# Patient Record
Sex: MALE | Race: WHITE | NOT HISPANIC OR LATINO | Employment: FULL TIME | URBAN - METROPOLITAN AREA
[De-identification: names, ages, dates, MRNs, and addresses within clinical notes are randomized per-mention and may not be internally consistent; named-entity substitution may affect disease eponyms.]

---

## 2024-09-20 ENCOUNTER — APPOINTMENT (OUTPATIENT)
Dept: RADIOLOGY | Facility: CLINIC | Age: 44
End: 2024-09-20
Payer: COMMERCIAL

## 2024-09-20 VITALS
BODY MASS INDEX: 34.4 KG/M2 | HEART RATE: 82 BPM | HEIGHT: 72 IN | DIASTOLIC BLOOD PRESSURE: 83 MMHG | SYSTOLIC BLOOD PRESSURE: 118 MMHG | WEIGHT: 254 LBS

## 2024-09-20 DIAGNOSIS — M25.511 RIGHT SHOULDER PAIN, UNSPECIFIED CHRONICITY: ICD-10-CM

## 2024-09-20 DIAGNOSIS — M24.811 INTERNAL DERANGEMENT OF RIGHT SHOULDER: Primary | ICD-10-CM

## 2024-09-20 PROCEDURE — 73030 X-RAY EXAM OF SHOULDER: CPT

## 2024-09-20 PROCEDURE — 99204 OFFICE O/P NEW MOD 45 MIN: CPT | Performed by: ORTHOPAEDIC SURGERY

## 2024-09-20 RX ORDER — IBUPROFEN 200 MG
TABLET ORAL EVERY 6 HOURS PRN
COMMUNITY

## 2024-09-20 RX ORDER — MELOXICAM 15 MG/1
15 TABLET ORAL DAILY
Qty: 30 TABLET | Refills: 0 | Status: SHIPPED | OUTPATIENT
Start: 2024-09-20
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
## 2024-09-20 NOTE — LETTER
September 20, 2024     Patient: Abimael Colón  YOB: 1980  Date of Visit: 9/20/2024      To Whom it May Concern:    Abimael Colón is under my professional care. Abimael was seen in my office on 9/20/2024. Abimael can return to work on light duty, including no pushing, pulling, or lifting greater than 5 pounds.     If you have any questions or concerns, please don't hesitate to call.         Sincerely,          Favian Toscano MD        CC: No Recipients

## 2024-10-04 ENCOUNTER — HOSPITAL ENCOUNTER (OUTPATIENT)
Dept: MRI IMAGING | Facility: HOSPITAL | Age: 44
End: 2024-10-04
Payer: OTHER MISCELLANEOUS

## 2024-10-04 DIAGNOSIS — M24.811 INTERNAL DERANGEMENT OF RIGHT SHOULDER: ICD-10-CM

## 2024-10-04 PROCEDURE — 73221 MRI JOINT UPR EXTREM W/O DYE: CPT

## 2024-10-08 ENCOUNTER — TELEPHONE (OUTPATIENT)
Age: 44
End: 2024-10-08

## 2024-10-11 VITALS
DIASTOLIC BLOOD PRESSURE: 90 MMHG | BODY MASS INDEX: 34.4 KG/M2 | SYSTOLIC BLOOD PRESSURE: 143 MMHG | HEIGHT: 72 IN | WEIGHT: 254 LBS | HEART RATE: 83 BPM

## 2024-10-11 DIAGNOSIS — S43.51XD SPRAIN OF RIGHT ACROMIOCLAVICULAR LIGAMENT, SUBSEQUENT ENCOUNTER: Primary | ICD-10-CM

## 2024-10-11 PROCEDURE — 99213 OFFICE O/P EST LOW 20 MIN: CPT | Performed by: ORTHOPAEDIC SURGERY

## 2024-10-11 RX ORDER — TRAMADOL HYDROCHLORIDE 50 MG/1
50 TABLET ORAL EVERY 8 HOURS PRN
COMMUNITY
Start: 2024-09-11

## 2024-10-11 NOTE — LETTER
October 11, 2024     Patient: Abimael Colón  YOB: 1980  Date of Visit: 10/11/2024      To Whom it May Concern:    Abimael Colón is under my professional care. Abimale was seen in my office on 10/11/2024. Abimael  continues to be treated for a shoulder injury. He will remain on light duty at this time with the following limitations: No lifting, pushing, or pulling greater than 20 pounds starting today, 10/11/24. He can return to full duty starting on 10/22/24.     If you have any questions or concerns, please don't hesitate to call.         Sincerely,          Favian Toscano MD
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
## 2024-10-31 ENCOUNTER — EVALUATION (OUTPATIENT)
Dept: PHYSICAL THERAPY | Facility: CLINIC | Age: 44
End: 2024-10-31
Payer: OTHER MISCELLANEOUS

## 2024-10-31 DIAGNOSIS — M54.50 ACUTE LEFT-SIDED LOW BACK PAIN, UNSPECIFIED WHETHER SCIATICA PRESENT: ICD-10-CM

## 2024-10-31 DIAGNOSIS — M25.511 ACUTE PAIN OF RIGHT SHOULDER: Primary | ICD-10-CM

## 2024-10-31 DIAGNOSIS — R29.898 RIGHT ARM WEAKNESS: ICD-10-CM

## 2024-10-31 DIAGNOSIS — S43.51XD SPRAIN OF RIGHT ACROMIOCLAVICULAR LIGAMENT, SUBSEQUENT ENCOUNTER: ICD-10-CM

## 2024-10-31 DIAGNOSIS — M25.531 RIGHT WRIST PAIN: ICD-10-CM

## 2024-10-31 DIAGNOSIS — S46.011D STRAIN OF RIGHT ROTATOR CUFF CAPSULE, SUBSEQUENT ENCOUNTER: ICD-10-CM

## 2024-10-31 DIAGNOSIS — M54.2 NECK PAIN: ICD-10-CM

## 2024-10-31 PROCEDURE — 97162 PT EVAL MOD COMPLEX 30 MIN: CPT
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
## 2024-11-05 ENCOUNTER — OFFICE VISIT (OUTPATIENT)
Dept: PHYSICAL THERAPY | Facility: CLINIC | Age: 44
End: 2024-11-05
Payer: OTHER MISCELLANEOUS

## 2024-11-05 DIAGNOSIS — M25.531 RIGHT WRIST PAIN: ICD-10-CM

## 2024-11-05 DIAGNOSIS — S43.51XD SPRAIN OF RIGHT ACROMIOCLAVICULAR LIGAMENT, SUBSEQUENT ENCOUNTER: Primary | ICD-10-CM

## 2024-11-05 DIAGNOSIS — R29.898 RIGHT ARM WEAKNESS: ICD-10-CM

## 2024-11-05 DIAGNOSIS — S46.011D STRAIN OF RIGHT ROTATOR CUFF CAPSULE, SUBSEQUENT ENCOUNTER: ICD-10-CM

## 2024-11-05 DIAGNOSIS — M54.2 NECK PAIN: ICD-10-CM

## 2024-11-05 DIAGNOSIS — M54.50 ACUTE LEFT-SIDED LOW BACK PAIN, UNSPECIFIED WHETHER SCIATICA PRESENT: ICD-10-CM

## 2024-11-05 DIAGNOSIS — M25.511 ACUTE PAIN OF RIGHT SHOULDER: ICD-10-CM

## 2024-11-05 PROCEDURE — 97110 THERAPEUTIC EXERCISES: CPT

## 2024-11-05 PROCEDURE — 97140 MANUAL THERAPY 1/> REGIONS: CPT

## 2024-11-05 NOTE — PROGRESS NOTES
Daily Note     Today's date: 2024  Patient name: Abimael Colón  : 1980  MRN: [id number]  Referring provider: Favian Toscano*  Dx:   Encounter Diagnosis     ICD-10-CM    1. Sprain of right acromioclavicular ligament, subsequent encounter  S43.51XD       2. Strain of right rotator cuff capsule, subsequent encounter  S46.011D       3. Acute pain of right shoulder  M25.511       4. Neck pain  M54.2       5. Right wrist pain  M25.531       6. Acute left-sided low back pain, unspecified whether sciatica present  M54.50       7. Right arm weakness  R29.898           Start Time: 1752  Stop Time: 1835  Total time in clinic (min): 43 minutes    Subjective: Pt states that he continues with significant R shoulder, L neck, and L low back pain. Pt has been gradually performing his HEP for his shoulder more but it is painful at times. Pt continues to have difficulty at his job due to the pain, he continues to limp, and has significant pain with transitioning positions.       Objective: See treatment diary below  C/S AROM:  -Flexion= minimal restrictions  -Extension= major restrictions w/pain  -Lateral flexion= moderate restrictions b/l w/pain  -Rotation= moderate restrictions R w/pain, minimal restrictions L  -Retraction= moderate restrictions w/pain  -Protrusion= normal w/pain    Assessment: Tolerated treatment well. Patient would benefit from continued PT. Assessed pt's shoulder HEP and was given minor VC for arm positioning to help reduce irritation. Pt's c/s was assessed today showing significant restrictions into most directions, however no changes seen in shoulder with c/s motions. Pt also demonstrated hypomobility with pain of all c/s vertebrae and guarding/spasms in his c/s musculature.       Plan: Continue per plan of care.  Progress treatment as tolerated.        Insurance:  AMA/CMS Eval/ Re-eval Auth #/ Referral # Total units or visits Start date  Expiration date KX? Visit limitation?  PT  only or  PT+OT? Co-Insurance   CMS 10/31/24   10/31/24 12/20/24                     POC Start Date POC Expiration Date Signed POC?   10/31/24 12/26/24 PENDING      Date               Visits/Units:  Used               Authed:  Remaining                      Precautions: standard  HEP: Access Code [email or web address]    Date    11/5 10/31/24   Visit Number    2 1 (IE)   Manuals        C/s PA mobilizations    Gr. I    STM    suboccipitals    C/s traction    BT performed            Neuro Re-Ed         Scap retr        Chin tucks    1x10 sitting  5x supine                                            Ther Ex        GH table slides        Cane ER PROM        Cane abd PROM        Towel  squeezes        Supine cane flexion PROM    1x10                            Ther Activity                        Gait Training                        Modalities

## 2024-11-14 ENCOUNTER — APPOINTMENT (OUTPATIENT)
Dept: PHYSICAL THERAPY | Facility: CLINIC | Age: 44
End: 2024-11-14
Payer: OTHER MISCELLANEOUS

## 2024-11-26 ENCOUNTER — OFFICE VISIT (OUTPATIENT)
Dept: PHYSICAL THERAPY | Facility: CLINIC | Age: 44
End: 2024-11-26
Payer: OTHER MISCELLANEOUS

## 2024-11-26 DIAGNOSIS — S43.51XD SPRAIN OF RIGHT ACROMIOCLAVICULAR LIGAMENT, SUBSEQUENT ENCOUNTER: Primary | ICD-10-CM

## 2024-11-26 DIAGNOSIS — M54.2 NECK PAIN: ICD-10-CM

## 2024-11-26 DIAGNOSIS — M25.531 RIGHT WRIST PAIN: ICD-10-CM

## 2024-11-26 DIAGNOSIS — R29.898 RIGHT ARM WEAKNESS: ICD-10-CM

## 2024-11-26 DIAGNOSIS — M25.511 ACUTE PAIN OF RIGHT SHOULDER: ICD-10-CM

## 2024-11-26 DIAGNOSIS — S46.011D STRAIN OF RIGHT ROTATOR CUFF CAPSULE, SUBSEQUENT ENCOUNTER: ICD-10-CM

## 2024-11-26 DIAGNOSIS — M54.50 ACUTE LEFT-SIDED LOW BACK PAIN, UNSPECIFIED WHETHER SCIATICA PRESENT: ICD-10-CM

## 2024-11-26 PROCEDURE — 97110 THERAPEUTIC EXERCISES: CPT

## 2024-11-26 NOTE — PROGRESS NOTES
Daily Note     Today's date: 2024  Patient name: Abimael Colón  : 1980  MRN: [id number]  Referring provider: Favian Toscano*  Dx:   Encounter Diagnosis     ICD-10-CM    1. Sprain of right acromioclavicular ligament, subsequent encounter  S43.51XD       2. Strain of right rotator cuff capsule, subsequent encounter  S46.011D       3. Acute pain of right shoulder  M25.511       4. Neck pain  M54.2       5. Right wrist pain  M25.531       6. Acute left-sided low back pain, unspecified whether sciatica present  M54.50       7. Right arm weakness  R29.898           Start Time: 175  Stop Time: 183  Total time in clinic (min): 46 minutes    Subjective: Pt reports that he has been doing better overall recently, he isn't walking with as much of a limp but still is experiencing L low back/hip pain and numbness into the posterior aspect of his leg. He has been able to move and use his R shoulder more with less pain but notes constant crepitus and also is a little bit more flexible in his neck without headaches. Besides the low back and LE symptoms pt continues with popping during any gripping with his R hand and continues to note weakness and difficulty with it. Pt has also begun noticing R knee pain the past few weeks that wasn't there after the surgery.       Objective: See treatment diary below  Lumbar AROM:  -Flexion = mild restriction w/pain  -Extension= moderate restriction w/pain  -Lateral flexion= L moderate restriction w/pain, R mild restriction  -Rotation= L mild restriction w/pain, R WNL    Mechanical Assessment L/S:  - RFIS- produced, NW  -ANA- produced, decreased    L Hip Special Testing:  RASHAD= positive  FADIR= positive    R Knee Special Testing:  Valgus @0= positive for pain    TTP along medial and lateral R knee joint lines  Hypomobility of R patella globally    Assessment: Tolerated treatment well. Patient demonstrated fatigue post treatment, exhibited good technique with  therapeutic exercises, and would benefit from continued PT. Focused today's session on assessing pt's low back & LE symptoms. Pt demonstrated pain with lumbar flexion, extension, L lateral flexion and L rotation. Repeated motion testing of the lumbar spine revealed decreased low back pain and tightness with repeated standing lumbar extension. No numbness provoked today. Pt demonstrated significant tightness of his L posterior and lateral hip musculature, hypomobility of his R patella, pain with R knee valgus stress testing, and TTP along the R knee joint line. Pt was educated on these findings as they pertain to his accident and injuries, with the R knee most likely being from overloading of the tendons and ligaments due to compensations from his L low back and hip pain. Pt's HEP was updated based on today's findings. More testing needed on pt's R hand/wrist symptoms.       Plan: Continue per plan of care.  Progress treatment as tolerated.          Insurance:  [place]/CMS Eval/ Re-eval Auth #/ Referral # Total units or visits Start date  Expiration date KX? Visit limitation?  PT only or  PT+OT? Co-Insurance   CMS 10/31/24   10/31/24 12/20/24                     POC Start Date POC Expiration Date Signed POC?   10/31/24 12/26/24 PENDING      Date               Visits/Units:  Used               Authed:  Remaining                      Precautions: standard  HEP: Access Code [email or web address]    Date   11/26 11/5 10/31/24   Visit Number   3 2 1 (IE)   Manuals        C/s PA mobilizations    Gr. I    STM    suboccipitals    C/s traction    BT performed            Neuro Re-Ed         Scap retr        Chin tucks    1x10 sitting  5x supine                                            Ther Ex        GH table slides        Cane ER PROM        Cane abd PROM        Towel  squeezes        Supine cane flexion PROM    1x10                            Ther Activity                        Gait Training                        Modalities

## 2024-12-05 ENCOUNTER — OFFICE VISIT (OUTPATIENT)
Dept: PHYSICAL THERAPY | Facility: CLINIC | Age: 44
End: 2024-12-05
Payer: OTHER MISCELLANEOUS

## 2024-12-05 DIAGNOSIS — S46.011D STRAIN OF RIGHT ROTATOR CUFF CAPSULE, SUBSEQUENT ENCOUNTER: ICD-10-CM

## 2024-12-05 DIAGNOSIS — M54.2 NECK PAIN: ICD-10-CM

## 2024-12-05 DIAGNOSIS — S43.51XD SPRAIN OF RIGHT ACROMIOCLAVICULAR LIGAMENT, SUBSEQUENT ENCOUNTER: Primary | ICD-10-CM

## 2024-12-05 DIAGNOSIS — R29.898 RIGHT ARM WEAKNESS: ICD-10-CM

## 2024-12-05 DIAGNOSIS — M25.511 ACUTE PAIN OF RIGHT SHOULDER: ICD-10-CM

## 2024-12-05 DIAGNOSIS — M25.531 RIGHT WRIST PAIN: ICD-10-CM

## 2024-12-05 DIAGNOSIS — M54.50 ACUTE LEFT-SIDED LOW BACK PAIN, UNSPECIFIED WHETHER SCIATICA PRESENT: ICD-10-CM

## 2024-12-05 PROCEDURE — 97110 THERAPEUTIC EXERCISES: CPT

## 2024-12-05 PROCEDURE — 97112 NEUROMUSCULAR REEDUCATION: CPT

## 2024-12-05 NOTE — PROGRESS NOTES
Daily Note     Today's date: 2024  Patient name: Abimael Colón  : 1980  MRN: [id number]  Referring provider: Favian Toscano*  Dx:   Encounter Diagnosis     ICD-10-CM    1. Sprain of right acromioclavicular ligament, subsequent encounter  S43.51XD       2. Strain of right rotator cuff capsule, subsequent encounter  S46.011D       3. Acute pain of right shoulder  M25.511       4. Neck pain  M54.2       5. Right wrist pain  M25.531       6. Acute left-sided low back pain, unspecified whether sciatica present  M54.50       7. Right arm weakness  R29.898           Start Time: 175  Stop Time: 183  Total time in clinic (min): 40 minutes    Subjective: Pt reports that he has been in training the past week so he hasn't had as much pain recently. Pt is feeling a little more flexibility recently, he continues with R shoulder pain, L knee pain, and wrist/hand pain with gripping. Pt feels there might be a little less popping in his hand with gripping but it is still present.       Objective: See treatment diary below      Assessment: Tolerated treatment well. Patient demonstrated fatigue post treatment, exhibited good technique with therapeutic exercises, and would benefit from continued PT. Pt was given UE and LE strengthening exercises as he has been improving with his overall ROM and reduction of pain.      Plan: Continue per plan of care.  Progress treatment as tolerated.          Insurance:  A/CMS Eval/ Re-eval Auth #/ Referral # Total units or visits Start date  Expiration date KX? Visit limitation?  PT only or  PT+OT? Co-Insurance   CMS 10/31/24   10/31/24 12/20/24                     POC Start Date POC Expiration Date Signed POC?   10/31/24 12/26/24 PENDING      Date               Visits/Units:  Used               Authed:  Remaining                      Precautions: standard  HEP: Access Code [email or web address]    Date  12/5 11/26 11/5 10/31/24   Visit Number  4 3 2 1 (IE)   Manuals        C/s PA  mobilizations    Gr. I    STM    suboccipitals    C/s traction    BT performed            Neuro Re-Ed         Scap retr  Rows & ext blue 15x ea      Chin tucks    1x10 sitting  5x supine    Supine horiz abd  RTB 2x10      SLR  3x stopped due to sharp pain    Quad sets 10x      TKE  [place] 3.6 2x10                      Ther Ex        GH table slides  Supine cane flex 1x10      Cane ER PROM        Cane abd PROM        Towel  squeezes        Supine cane flexion PROM    1x10                            Ther Activity                        Gait Training                        Modalities

## 2024-12-12 ENCOUNTER — OFFICE VISIT (OUTPATIENT)
Dept: PHYSICAL THERAPY | Facility: CLINIC | Age: 44
End: 2024-12-12
Payer: OTHER MISCELLANEOUS

## 2024-12-12 DIAGNOSIS — M25.531 RIGHT WRIST PAIN: ICD-10-CM

## 2024-12-12 DIAGNOSIS — R29.898 RIGHT ARM WEAKNESS: ICD-10-CM

## 2024-12-12 DIAGNOSIS — S43.51XD SPRAIN OF RIGHT ACROMIOCLAVICULAR LIGAMENT, SUBSEQUENT ENCOUNTER: Primary | ICD-10-CM

## 2024-12-12 DIAGNOSIS — S46.011D STRAIN OF RIGHT ROTATOR CUFF CAPSULE, SUBSEQUENT ENCOUNTER: ICD-10-CM

## 2024-12-12 DIAGNOSIS — M54.2 NECK PAIN: ICD-10-CM

## 2024-12-12 DIAGNOSIS — M54.50 ACUTE LEFT-SIDED LOW BACK PAIN, UNSPECIFIED WHETHER SCIATICA PRESENT: ICD-10-CM

## 2024-12-12 DIAGNOSIS — M25.511 ACUTE PAIN OF RIGHT SHOULDER: ICD-10-CM

## 2024-12-12 PROCEDURE — 97112 NEUROMUSCULAR REEDUCATION: CPT

## 2024-12-12 NOTE — PROGRESS NOTES
Daily Note     Today's date: 2024  Patient name: Abimael Colón  : 1980  MRN: [id number]  Referring provider: Favian Toscano*  Dx:   Encounter Diagnosis     ICD-10-CM    1. Sprain of right acromioclavicular ligament, subsequent encounter  S43.51XD       2. Strain of right rotator cuff capsule, subsequent encounter  S46.011D       3. Acute pain of right shoulder  M25.511       4. Neck pain  M54.2       5. Right wrist pain  M25.531       6. Acute left-sided low back pain, unspecified whether sciatica present  M54.50       7. Right arm weakness  R29.898                      Subjective: pt reports during the week he had some discomfort in his R shoulder,  he felt stiff and when he did move his shoulder he had pain with this. Earlier today he did have some popping and cracking in the shoulder when putting his jacket that was associated with a little bit of discomfort       Objective: See treatment diary below      Assessment: Tolerated treatment fair. Pt dem increased knee pain w/ flexed positions. Able to add some LB stretching w/ good tolerance. Pt remains limited due to pain in R shoulder, LB, and R knee. Cont to progress functional mobility as tolerated per symptom irritability. Patient would benefit from continued PT      Plan: Continue per plan of care.         Insurance:  A/CMS Eval/ Re-eval Auth #/ Referral # Total units or visits Start date  Expiration date KX? Visit limitation?  PT only or  PT+OT? Co-Insurance   CMS 10/31/24   10/31/24 12/20/24                     POC Start Date POC Expiration Date Signed POC?   10/31/24 12/26/24 PENDING      Date               Visits/Units:  Used               Authed:  Remaining                      Precautions: standard  HEP: Access Code [email or web address]    Date 12/12 12/5 11/26 11/5 10/31/24   Visit Number 5 4 3 2 1 (IE)   Manuals        C/s PA mobilizations    Gr. I    STM    suboccipitals    C/s traction    BT performed            Neuro Re-Ed  20'     "   Scap retr Rows & ext blue tubing 15x ea     Pain w/ rows at end range  Rows & ext blue 15x ea      Chin tucks    1x10 sitting  5x supine    Supine horiz abd Std blue tubing 1x10     Supine AROM in pain free range 1x10  RTB 2x10      B/L shoulder ER w/ scap set  YTB 1x10        LTR w/ legs supported on grn pball  5\"x5 R/L        Std pball press  5\"x15 grn pball on plinth        SLR  3x stopped due to sharp pain    Quad sets 10x      TKE  Ricarda 3.6 2x10                      Ther Ex        GH table slides Supine cane flex 1x10 Supine cane flex 1x10      Supine chest press w/ cane  2.5# CW on cane 1x10        Cane ER PROM        Cane abd PROM        Towel  squeezes        Supine cane flexion PROM    1x10                            Ther Activity                        Gait Training                        Modalities                                       "

## 2024-12-19 ENCOUNTER — EVALUATION (OUTPATIENT)
Dept: PHYSICAL THERAPY | Facility: CLINIC | Age: 44
End: 2024-12-19
Payer: OTHER MISCELLANEOUS

## 2024-12-19 DIAGNOSIS — R29.898 RIGHT ARM WEAKNESS: ICD-10-CM

## 2024-12-19 DIAGNOSIS — M25.531 RIGHT WRIST PAIN: ICD-10-CM

## 2024-12-19 DIAGNOSIS — M54.2 NECK PAIN: ICD-10-CM

## 2024-12-19 DIAGNOSIS — M25.511 ACUTE PAIN OF RIGHT SHOULDER: ICD-10-CM

## 2024-12-19 DIAGNOSIS — S46.011D STRAIN OF RIGHT ROTATOR CUFF CAPSULE, SUBSEQUENT ENCOUNTER: ICD-10-CM

## 2024-12-19 DIAGNOSIS — S43.51XD SPRAIN OF RIGHT ACROMIOCLAVICULAR LIGAMENT, SUBSEQUENT ENCOUNTER: Primary | ICD-10-CM

## 2024-12-19 DIAGNOSIS — M54.50 ACUTE LEFT-SIDED LOW BACK PAIN, UNSPECIFIED WHETHER SCIATICA PRESENT: ICD-10-CM

## 2024-12-19 PROCEDURE — 97110 THERAPEUTIC EXERCISES: CPT

## 2024-12-19 NOTE — PROGRESS NOTES
PT Re-Evaluation     Today's date: 2024  Patient name: Abimael Colón  : 1980  MRN: [id number]  Referring provider: Favian Toscano*  Dx:   Encounter Diagnosis     ICD-10-CM    1. Sprain of right acromioclavicular ligament, subsequent encounter  S43.51XD       2. Strain of right rotator cuff capsule, subsequent encounter  S46.011D       3. Acute pain of right shoulder  M25.511       4. Right wrist pain  M25.531       5. Neck pain  M54.2       6. Acute left-sided low back pain, unspecified whether sciatica present  M54.50       7. Right arm weakness  R29.898             Start Time: 1750  Stop Time: 1830  Total time in clinic (min): 40 minutes    Assessment  Impairments: abnormal coordination, abnormal gait, abnormal muscle firing, abnormal muscle tone, abnormal or restricted ROM, abnormal movement, activity intolerance, impaired physical strength, pain with function, poor posture , poor body mechanics, unable to perform ADL, participation limitations, activity limitations and endurance  Symptom irritability: moderate    Assessment details: Pt is a 44 y.o male who presents to skilled physical therapy for his 6th visit following a work related accident resulting in injuries to his R shoulder, forearm, and R hand/wrist, neck and low back. At time of re-evaluation, pt demonstrated fair posture, improving R shoulder A/PROM in all directions with pain at end ranges, increased R shoulder & elbow MMT with pain, slightly increased R hand  strength with popping noted, improving but restricted AROM of his c/s and l/s, and slightly improved R knee strength. Pt has shown good strides since starting PT, however he continues with pain and restrictions which limits his ability to perform ADLs, recreational activities, and work duties without compensations. His shoulder and wrist/hand injury severely limits him from grabbing, holding, and lifting anything without pain and difficulty or results in him  dropping items. At one of pt's previous sessions, more special testing of his hand was performed which revealed possible TFCC tear. Pt was re-educated on his diagnosis, prognosis, POC, and HEP. Pt would continue to benefit from skilled physical therapy to reduce his pain, address his deficits, progress towards his goals, and return to his PLOF. Pt was advised to return to his referring provider for further evaluation of his R wrist due to concern of significant TFCC injury.   Understanding of Dx/Px/POC: good     Prognosis: fair    Goals  Short Term Goals:  1) Pt will initiate and progress his HEP in 3-4 weeks.- Met  2) Pt will improve his shoulder flexion A/PROM to at least 160 degrees to improve his ADLs in 3-4 weeks.- Progressing  3) Pt will improve his shoulder MMT by 1 to improve his ADLs in 3-4 weeks.- Partially Met  4) Pt will improve his  strength to at least 80lbs to help him carry a cup in 3-4 weeks.- Progressing    Long Term Goals:  1) Pt will be able to sleep 6-8 hours without waking up from pain in 6-8 weeks.- Progressing  2) Pt will be able to take a shower without shoulder pain in 6-8 weeks.- Progressing  3) Pt will be able to reach overhead to grab a plate with less than 3/10 pain in 6-8 weeks.- Progressing  4) Pt will be able to perform his work duties with less than 3/10 pain in 6-8 weeks.- Progressing  5) Pt will be able to perform his ADLs with less than 3/10 pain in 6-8 weeks.- Progressing    Plan  Patient would benefit from: skilled physical therapy and PT eval  Planned modality interventions: cryotherapy and thermotherapy: hydrocollator packs    Planned therapy interventions: activity modification, joint mobilization, manual therapy, ADL retraining, body mechanics training, motor coordination training, neuromuscular re-education, coordination, patient/caregiver education, postural training, self care, strengthening, stretching, therapeutic activities, therapeutic exercise, flexibility,  functional ROM exercises, graded exercise and home exercise program    Frequency: 1-2x week  Duration in weeks: 8  Plan of Care beginning date: 2024  Plan of Care expiration date: 2025  Treatment plan discussed with: patient        Subjective Evaluation    History of Present Illness  Mechanism of injury: trauma  Mechanism of injury: 24 Update:  Pt states things have been going well for the most part, but last week was tough due to an increase in his pain. Pt feels ok this week. Pt feels that he is able to do more with his shoulder, however he continues to get discomfort and pain from time to time. Pt still has R knee pain and isn't sure why. Pt's low back pain is less consistent. Pt continues to have popping in his R hand with any gripping and feels that it can still be debilitating. Pt feels that his shoulder and his wrist are really holding him back besides the other areas that are giving him problems/pain. Pt has been able to do more over the past few weeks but he needs to take pain medications, breaks, and modifications to get through certain activities. Pt feels that he is getting close to 80-85% to where he wants to be, or that he can at least push through most of his pain.  Patient Goals  Patient goals for therapy: decreased pain, increased motion, increased strength, independence with ADLs/IADLs, return to sport/leisure activities and return to work    Pain  Current pain ratin  At best pain ratin  At worst pain ratin (knee and shoulder)  Location: anterior shoulder, anterior forearm, R hand, L side of neck, L low back/glute, R knee  Quality: tight, sharp, dull ache, burning and discomfort  Relieving factors: medications  Aggravating factors: overhead activity, lifting and sitting (sleeping)  Progression: improved    Social Support  Lives with: spouse and adult children    Employment status: working  Hand dominance: right      Diagnostic Tests  X-ray: normal  MRI studies:  abnormal        Objective     Postural Observations  Seated posture: fair  Standing posture: fair      Palpation     Right   No palpable tenderness to the latissimus.   Hypertonic in the pectoralis major and upper trapezius.   Tenderness of the anterior deltoid, biceps, pectoralis major, pectoralis minor, supraspinatus, triceps and upper trapezius.     Tenderness     Right Shoulder  Tenderness in the AC joint, biceps tendon (proximal) and bicipital groove. No tenderness in the infraspinatus tendon, lateral scapula, medial scapula, scapular spine, SC joint and supraspinatus tendon.     Active Range of Motion   Cervical/Thoracic Spine       Cervical    Flexion:  Restriction level: minimal  Extension:  Restriction level: moderate  Left lateral flexion:  Restriction level: moderate  Right lateral flexion:  Restriction level minimal  Left rotation:  Restriction level: moderate  Right rotation:  Restriction level: minimal  Left Shoulder   External rotation BTH: T1   Internal rotation BTB: T9     Right Shoulder   Flexion: 149 degrees   Abduction: 130 degrees   Adduction: Right shoulder active adduction: can't touch opposite shoulder.   External rotation BTH: T1   Internal rotation BTB: L4 with pain    Lumbar   Flexion:  with pain Restriction level: minimal  Extension:  with pain Restriction level: moderate  Left lateral flexion:  with pain Restriction level: moderate  Right lateral flexion:  Restriction level: minimal  Left rotation:  Restriction level: minimal  Right rotation:  Restriction level: minimal    Passive Range of Motion     Right Shoulder   Flexion: 115 degrees with pain  Abduction: 100 degrees with pain  External rotation 90°: 40 degrees with pain  Internal rotation 90°: 30 degrees with pain    Strength/Myotome Testing     Right Shoulder     Planes of Motion   Flexion: 4-   Abduction: 3+   External rotation at 0°: 4-   Internal rotation at 0°: 4     Left Elbow   Flexion: 5  Extension: 5    Right Elbow  "  Flexion: 4-  Extension: 4    Left Wrist/Hand      (2nd hand position)     Trial 1: 100    Trial 2: 80    Trial 3: 60    Average: 80    Right Wrist/Hand      (2nd hand position)     Trial 1: 45    Trial 2: 40    Trial 3: 43    Average: 42.67    Additional Strength Details  Pain with all resisted testing    Tests     Right Shoulder   Positive empty can, full can, Hawkin's, Neer's and painful arc.     Ambulation   Weight-Bearing Status   Weight-Bearing Status (Left): full weight bearing   Weight-Bearing Status (Right): full weight-bearing    Assistive device used: none    Observational Gait   Gait: asymmetric   Increased left stance time and right swing time. Decreased walking speed, stride length, right stance time, left swing time, left step length and right step length.                 Insurance:  [place]/CMS Eval/ Re-eval Auth #/ Referral # Total units or visits Start date  Expiration date KX? Visit limitation?  PT only or  PT+OT? Co-Insurance   CMS 10/31/24   10/31/24 12/20/24       CMS 12/19/24             POC Start Date POC Expiration Date Signed POC?   10/31/24 12/26/24 YES   12/19/24 2/20/25 PENDING      Date               Visits/Units:  Used               Authed:  Remaining                      Precautions: standard  HEP: Access Code [email or web address]    Date 12/19 12/12 12/5 11/26 11/5 10/31/24   Visit Number 6 5 4 3 2 1 (IE)   Manuals         C/s PA mobilizations     Gr. I    STM     suboccipitals    C/s traction     BT performed             Neuro Re-Ed   20'       Scap retr  Rows & ext blue tubing 15x ea     Pain w/ rows at end range  Rows & ext blue 15x ea      Chin tucks     1x10 sitting  5x supine    Supine horiz abd  Std blue tubing 1x10     Supine AROM in pain free range 1x10  RTB 2x10      B/L shoulder ER w/ scap set   YTB 1x10        LTR w/ legs supported on grn pball   5\"x5 R/L        Std pball press   5\"x15 grn pball on plinth        SLR   3x stopped due to sharp pain    Quad sets 10x      TKE   " Ricarda 3.6 2x10                        Ther Ex         GH table slides  Supine cane flex 1x10 Supine cane flex 1x10      Supine chest press w/ cane   2.5# CW on cane 1x10        Cane ER PROM         Cane abd PROM         Towel  squeezes         Supine cane flexion PROM     1x10                               Ther Activity                           Gait Training                           Modalities

## 2025-01-02 ENCOUNTER — OFFICE VISIT (OUTPATIENT)
Dept: PHYSICAL THERAPY | Facility: CLINIC | Age: 45
End: 2025-01-02
Payer: OTHER MISCELLANEOUS

## 2025-01-02 DIAGNOSIS — R29.898 RIGHT ARM WEAKNESS: ICD-10-CM

## 2025-01-02 DIAGNOSIS — M25.531 RIGHT WRIST PAIN: ICD-10-CM

## 2025-01-02 DIAGNOSIS — M54.2 NECK PAIN: ICD-10-CM

## 2025-01-02 DIAGNOSIS — S43.51XD SPRAIN OF RIGHT ACROMIOCLAVICULAR LIGAMENT, SUBSEQUENT ENCOUNTER: Primary | ICD-10-CM

## 2025-01-02 DIAGNOSIS — M54.50 ACUTE LEFT-SIDED LOW BACK PAIN, UNSPECIFIED WHETHER SCIATICA PRESENT: ICD-10-CM

## 2025-01-02 DIAGNOSIS — S46.011D STRAIN OF RIGHT ROTATOR CUFF CAPSULE, SUBSEQUENT ENCOUNTER: ICD-10-CM

## 2025-01-02 DIAGNOSIS — M25.511 ACUTE PAIN OF RIGHT SHOULDER: ICD-10-CM

## 2025-01-02 PROCEDURE — 97110 THERAPEUTIC EXERCISES: CPT

## 2025-01-02 PROCEDURE — 97112 NEUROMUSCULAR REEDUCATION: CPT

## 2025-01-02 NOTE — PROGRESS NOTES
Daily Note     Today's date: 2025  Patient name: Abimael Colón  : 1980  MRN: [id number]  Referring provider: Favian Toscano*  Dx:   Encounter Diagnosis     ICD-10-CM    1. Sprain of right acromioclavicular ligament, subsequent encounter  S43.51XD       2. Strain of right rotator cuff capsule, subsequent encounter  S46.011D       3. Acute pain of right shoulder  M25.511       4. Right wrist pain  M25.531       5. Neck pain  M54.2       6. Acute left-sided low back pain, unspecified whether sciatica present  M54.50       7. Right arm weakness  R29.898           Start Time: 175  Stop Time: 183  Total time in clinic (min): 41 minutes    Subjective: Pt states feeling a little better over the last few weeks, its been hectic with the holidays and hasn't been able to reschedule with Dr. Toscano to look at his wrist injury from the accident. Pt feels his biggest issue currently is his shoulder and his lack of . Pt admits to having continued knee pain but after his is sedentary for a longer period of time.       Objective: See treatment diary below      Assessment: Tolerated treatment well. Patient demonstrated fatigue post treatment, exhibited good technique with therapeutic exercises, and would benefit from continued PT      Plan: Continue per plan of care.  Progress treatment as tolerated.          Insurance:  [place]/CMS Eval/ Re-eval Auth #/ Referral # Total units or visits Start date  Expiration date KX? Visit limitation?  PT only or  PT+OT? Co-Insurance   CMS 10/31/24   10/31/24 12/20/24       CMS 24             POC Start Date POC Expiration Date Signed POC?   10/31/24 12/26/24 YES   24 YES      Date               Visits/Units:  Used               Authed:  Remaining                      Precautions: standard  HEP: Access Code [email or web address]    Date 25   Visit Number 7 6 5 4 3   Manuals        C/s PA mobilizations        STM        C/s traction       "          Neuro Re-Ed    20'     Scap retr   Rows & ext blue tubing 15x ea     Pain w/ rows at end range  Rows & ext blue 15x ea    Chin tucks        Supine horiz abd   Std blue tubing 1x10     Supine AROM in pain free range 1x10  RTB 2x10    B/L shoulder ER w/ scap set    YTB 1x10      LTR w/ legs supported on grn pball    5\"x5 R/L      Std pball press    5\"x15 grn pball on plinth      SLR    3x stopped due to sharp pain    Quad sets 10x    TKE    Ricarda 3.6 2x10    Prone I, T, Y 2x10 ea       Ball wall circles 30x cw/ccw @90 flex & abd       Ther Ex        GH table slides Supine cane flex 2x10  Supine cane flex 1x10 Supine cane flex 1x10    Supine chest press w/ cane    2.5# CW on cane 1x10      Cane ER PROM        Cane abd PROM        Towel  squeezes        Supine cane flexion PROM        Lat pull down Ricarda 3.5 3x10       Standing GH horizontal abd Ricarda 1.0 1x10               Ther Activity                        Gait Training                        Modalities                                           "

## 2025-01-07 ENCOUNTER — TELEPHONE (OUTPATIENT)
Dept: OBGYN CLINIC | Facility: CLINIC | Age: 45
End: 2025-01-07

## 2025-01-07 NOTE — TELEPHONE ENCOUNTER
LM stating that Katie had scheduled him for an appt in the [place] office 1/8 at 3:45 and if that time wasn't good to call back to reschedule

## 2025-01-08 ENCOUNTER — OFFICE VISIT (OUTPATIENT)
Dept: OBGYN CLINIC | Facility: CLINIC | Age: 45
End: 2025-01-08
Payer: OTHER MISCELLANEOUS

## 2025-01-08 ENCOUNTER — APPOINTMENT (OUTPATIENT)
Dept: RADIOLOGY | Facility: CLINIC | Age: 45
End: 2025-01-08
Payer: COMMERCIAL

## 2025-01-08 VITALS — HEIGHT: 72 IN | WEIGHT: 254 LBS | BODY MASS INDEX: 34.4 KG/M2

## 2025-01-08 DIAGNOSIS — M19.049 CMC ARTHRITIS: ICD-10-CM

## 2025-01-08 DIAGNOSIS — M25.531 RIGHT WRIST PAIN: ICD-10-CM

## 2025-01-08 DIAGNOSIS — M25.531 RIGHT WRIST PAIN: Primary | ICD-10-CM

## 2025-01-08 PROBLEM — M18.9 CMC ARTHRITIS, THUMB, DEGENERATIVE: Status: ACTIVE | Noted: 2025-01-08

## 2025-01-08 PROBLEM — M18.9 CMC ARTHRITIS, THUMB, DEGENERATIVE: Status: RESOLVED | Noted: 2025-01-08 | Resolved: 2025-01-08

## 2025-01-08 PROCEDURE — 99213 OFFICE O/P EST LOW 20 MIN: CPT | Performed by: STUDENT IN AN ORGANIZED HEALTH CARE EDUCATION/TRAINING PROGRAM

## 2025-01-08 PROCEDURE — 73110 X-RAY EXAM OF WRIST: CPT

## 2025-01-08 RX ORDER — ESCITALOPRAM OXALATE 20 MG/1
20 TABLET ORAL DAILY
COMMUNITY
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
## 2025-01-16 ENCOUNTER — OFFICE VISIT (OUTPATIENT)
Dept: PHYSICAL THERAPY | Facility: CLINIC | Age: 45
End: 2025-01-16
Payer: OTHER MISCELLANEOUS

## 2025-01-16 DIAGNOSIS — R29.898 RIGHT ARM WEAKNESS: ICD-10-CM

## 2025-01-16 DIAGNOSIS — S43.51XD SPRAIN OF RIGHT ACROMIOCLAVICULAR LIGAMENT, SUBSEQUENT ENCOUNTER: Primary | ICD-10-CM

## 2025-01-16 DIAGNOSIS — M54.50 ACUTE LEFT-SIDED LOW BACK PAIN, UNSPECIFIED WHETHER SCIATICA PRESENT: ICD-10-CM

## 2025-01-16 DIAGNOSIS — M25.531 RIGHT WRIST PAIN: ICD-10-CM

## 2025-01-16 DIAGNOSIS — M54.2 NECK PAIN: ICD-10-CM

## 2025-01-16 DIAGNOSIS — M25.511 ACUTE PAIN OF RIGHT SHOULDER: ICD-10-CM

## 2025-01-16 DIAGNOSIS — S46.011D STRAIN OF RIGHT ROTATOR CUFF CAPSULE, SUBSEQUENT ENCOUNTER: ICD-10-CM

## 2025-01-16 PROCEDURE — 97112 NEUROMUSCULAR REEDUCATION: CPT

## 2025-01-16 PROCEDURE — 97110 THERAPEUTIC EXERCISES: CPT

## 2025-01-16 NOTE — PROGRESS NOTES
Daily Note     Today's date: 2025  Patient name: Abimael Colón  : 1980  MRN: [id number]  Referring provider: Favian Toscano*  Dx:   Encounter Diagnosis     ICD-10-CM    1. Sprain of right acromioclavicular ligament, subsequent encounter  S43.51XD       2. Acute pain of right shoulder  M25.511       3. Strain of right rotator cuff capsule, subsequent encounter  S46.011D       4. Right wrist pain  M25.531       5. Neck pain  M54.2       6. Acute left-sided low back pain, unspecified whether sciatica present  M54.50       7. Right arm weakness  R29.898           Start Time: 175  Stop Time: 1835  Total time in clinic (min): 38 minutes    Subjective: Pt reports that his shoulder is very tired, possibly from work. His shoulder is getting better with sleeping, however his R knee is bothering him a lot recently and he isn't sure why. Pt states his pain is around his R patella. Pt saw Dr. Shields for his R wrist pain and popping, was told he has arthritis.       Objective: See treatment diary below      Assessment: Tolerated treatment well. Patient demonstrated fatigue post treatment, exhibited good technique with therapeutic exercises, and would benefit from continued PT. Pt exhibited tightness into his R quad and pain in his distal quad tendon. Pt given patellar mobility exercises as well as quad stretching and strengthening since he had difficulty performing SLR.       Plan: Continue per plan of care.  Progress treatment as tolerated.          Insurance:  A/CMS Eval/ Re-eval Auth #/ Referral # Total units or visits Start date  Expiration date KX? Visit limitation?  PT only or  PT+OT? Co-Insurance   CMS 10/31/24   10/31/24 12/20/24       CMS 24             POC Start Date POC Expiration Date Signed POC?   10/31/24 12/26/24 YES   24 YES      Date               Visits/Units:  Used               Authed:  Remaining                      Precautions: standard  HEP: Access Code  "[email or web address]    Date 1/16 1/2/25 12/19 12/12 12/5 11/26   Visit Number [phone]   Manuals         C/s PA mobilizations         STM         C/s traction                  Neuro Re-Ed     20'     Scap retr    Rows & ext blue tubing 15x ea     Pain w/ rows at end range  Rows & ext blue 15x ea    Chin tucks         Supine horiz abd    Std blue tubing 1x10     Supine AROM in pain free range 1x10  RTB 2x10    B/L shoulder ER w/ scap set     YTB 1x10      LTR w/ legs supported on grn pball     5\"x5 R/L      Std pball press     5\"x15 grn pball on plinth      LAQ into HS curl 1x10 ea        Quad sets 1x15        SLR 1x15    3x stopped due to sharp pain    Quad sets 10x    TKE     [place] 3.6 2x10    Prone I, T, Y  2x10 ea       Ball wall circles  30x cw/ccw @90 flex & abd       Ther Ex         GH table slides  Supine cane flex 2x10  Supine cane flex 1x10 Supine cane flex 1x10    Supine chest press w/ cane     2.5# CW on cane 1x10      Cane ER PROM         Cane abd PROM         Towel  squeezes         Supine cane flexion PROM         Lat pull down  [place] 3.5 3x10       Standing GH horizontal abd  [place] 1.0 1x10       Prone quad str 4x30s        Heel slides 1x15        Ther Activity                           Gait Training                           Modalities                                                "

## 2025-01-21 ENCOUNTER — OFFICE VISIT (OUTPATIENT)
Dept: OBGYN CLINIC | Facility: CLINIC | Age: 45
End: 2025-01-21
Payer: OTHER MISCELLANEOUS

## 2025-01-21 VITALS — BODY MASS INDEX: 31.83 KG/M2 | WEIGHT: 235 LBS | HEIGHT: 72 IN

## 2025-01-21 DIAGNOSIS — M24.811 INTERNAL DERANGEMENT OF RIGHT SHOULDER: ICD-10-CM

## 2025-01-21 PROCEDURE — 99213 OFFICE O/P EST LOW 20 MIN: CPT | Performed by: ORTHOPAEDIC SURGERY

## 2025-01-21 RX ORDER — MELOXICAM 15 MG/1
15 TABLET ORAL DAILY
Qty: 60 TABLET | Refills: 0 | Status: SHIPPED | OUTPATIENT
Start: 2025-01-21

## 2025-01-21 NOTE — LETTER
January 23, 2025     Patient: Abimael Colón  YOB: 1980  Date of Visit: 1/21/2025      To Whom it May Concern:    Abimael Colón is under my professional care. Abimael was seen in my office on 1/21/2025. Abimael may continue to work full duty without limitations at this time.    If you have any questions or concerns, please don't hesitate to call.         Sincerely,          Favian Toscano MD

## 2025-01-23 NOTE — PROGRESS NOTES
Orthopedic Sports Medicine Follow Patient Visit     Assesment:   44 y.o. male with grade 1 AC joint sprain, underlying AC joint arthritis     Plan:    Patient continues to be mostly painful at his AC joint and tender there as well.  He did injure this area in his work-related incident but also has underlying degenerative changes.  I feel he has reached MMI related to his acute injury at work.  His ongoing pain is more related to his underlying degenerative changes.  I explained that I am okay with him continue to work without limitation with this issue.  He may continue activities as tolerated outside of work as well.  We discussed options for ongoing treatment including NSAIDs, injections, surgery.  At this time he would like to continue self-directed exercises and NSAID use.  He is not interested in injections today but may consider this at future visits if he continues to hurt.  Eventually he may benefit from a distal clavicle excision if the pain becomes more of an issue.        History of Present Illness:    The patient is a 44 y.o., following up for a work-related injury.  He has been able to continue working at this time.  He continued to have pain most on the superior aspect of his shoulder.  This is worse with overhead reaching or reaching across his body.  It is localized to the AC joint.  He occasionally has pain posterior as well but this is not as frequent as the top of the shoulder.  He denies neck pain today.  Denies numbness or tingling.    Shoulder Surgical History:  None    Past Medical, Social and Family History:  History reviewed. No pertinent past medical history.  Past Surgical History:   Procedure Laterality Date    CHOLECYSTECTOMY      2015     Allergies   Allergen Reactions    Ciprofloxacin Other (See Comments)     Weakens his ligament     Current Outpatient Medications on File Prior to Visit   Medication Sig Dispense Refill    escitalopram (LEXAPRO) 20 mg tablet Take 20 mg by mouth daily       ibuprofen (MOTRIN) 200 mg tablet Take by mouth every 6 (six) hours as needed for mild pain      traMADol (ULTRAM) 50 mg tablet Take 50 mg by mouth every 8 (eight) hours as needed (Patient not taking: Reported on 1/21/2025)       No current facility-administered medications on file prior to visit.     Social History     Socioeconomic History    Marital status: /Civil Union     Spouse name: Not on file    Number of children: Not on file    Years of education: Not on file    Highest education level: Not on file   Occupational History    Not on file   Tobacco Use    Smoking status: Never    Smokeless tobacco: Never   Substance and Sexual Activity    Alcohol use: Yes     Alcohol/week: 1.0 standard drink of alcohol     Types: 1 Cans of beer per week    Drug use: Never    Sexual activity: Never   Other Topics Concern    Not on file   Social History Narrative    Not on file     Social Drivers of Health     Financial Resource Strain: Not on file   Food Insecurity: Not on file   Transportation Needs: Not on file   Physical Activity: Not on file   Stress: Not on file   Social Connections: Not on file   Intimate Partner Violence: Not on file   Housing Stability: Not on file         I have reviewed the past medical, surgical, social and family history, medications and allergies as documented in the EMR.    Review of systems: ROS is negative other than that noted in the HPI.  Constitutional: Negative for fatigue and fever.   HENT: Negative for sore throat.    Respiratory: Negative for shortness of breath.    Cardiovascular: Negative for chest pain.   Gastrointestinal: Negative for abdominal pain.   Endocrine: Negative for cold intolerance and heat intolerance.   Genitourinary: Negative for flank pain.   Musculoskeletal: Negative for back pain.   Skin: Negative for rash.   Allergic/Immunologic: Negative for immunocompromised state.   Neurological: Negative for dizziness.   Psychiatric/Behavioral: Negative for agitation.       Physical Exam:    Height 6' (1.829 m), weight 107 kg (235 lb).    General/Constitutional: NAD, well developed, well nourished  HENT: Normocephalic, atraumatic  CV: Intact distal pulses, regular rate  Resp: No respiratory distress or labored breathing  Abdomen: soft, nondistended, non tender   Lymphatic: No lymphadenopathy palpated  Neuro: Alert and Oriented x 3, no focal deficits  Psych: Normal mood, normal affect  Skin: Warm, dry, no rashes, no erythema      Shoulder Exam:      Inspection: No ecchymosis, edema, or deformity. No visualized wounds or skin lesions   Palpation: Very tender at the AC joint today  Active Motion:       FF: 160       ER: 70       IR: T8  Strength: 5/5 empty can with pain, 5/5 ER,  5/5 IR   Sensory - SILT in the Radial / Ulnar / Median / Axillary nerve distributions  Motor - AIN / PIN / Radial / Ulnar / Median / Axillary motor nerves in tact  Palpable Radial pulse  Cap refill <2secs in all digits    Pain with cross body adduction        Imaging    I reviewed and interpreted X-rays of the right shoulder which show no acute osseous abnormalities. No significant AC or GH joint degenerative changes. Humerus is well-centered on the glenoid.

## 2025-01-30 ENCOUNTER — APPOINTMENT (OUTPATIENT)
Dept: PHYSICAL THERAPY | Facility: CLINIC | Age: 45
End: 2025-01-30
Payer: OTHER MISCELLANEOUS

## 2025-02-20 ENCOUNTER — EVALUATION (OUTPATIENT)
Dept: PHYSICAL THERAPY | Facility: CLINIC | Age: 45
End: 2025-02-20
Payer: OTHER MISCELLANEOUS

## 2025-02-20 DIAGNOSIS — M25.511 ACUTE PAIN OF RIGHT SHOULDER: ICD-10-CM

## 2025-02-20 DIAGNOSIS — S43.51XD SPRAIN OF RIGHT ACROMIOCLAVICULAR LIGAMENT, SUBSEQUENT ENCOUNTER: Primary | ICD-10-CM

## 2025-02-20 DIAGNOSIS — M54.50 ACUTE LEFT-SIDED LOW BACK PAIN WITHOUT SCIATICA: ICD-10-CM

## 2025-02-20 DIAGNOSIS — S46.011D STRAIN OF RIGHT ROTATOR CUFF CAPSULE, SUBSEQUENT ENCOUNTER: ICD-10-CM

## 2025-02-20 DIAGNOSIS — M54.2 NECK PAIN: ICD-10-CM

## 2025-02-20 DIAGNOSIS — M25.561 CHRONIC PAIN OF RIGHT KNEE: ICD-10-CM

## 2025-02-20 DIAGNOSIS — M25.531 RIGHT WRIST PAIN: ICD-10-CM

## 2025-02-20 DIAGNOSIS — G89.29 CHRONIC PAIN OF RIGHT KNEE: ICD-10-CM

## 2025-02-20 DIAGNOSIS — R29.898 RIGHT ARM WEAKNESS: ICD-10-CM

## 2025-02-20 PROCEDURE — 97110 THERAPEUTIC EXERCISES: CPT

## 2025-02-20 NOTE — PROGRESS NOTES
PT Re-Evaluation     Today's date: 2025  Patient name: Abimael Colón  : 1980  MRN: [id number]  Referring provider: Favian Toscano*  Dx:   Encounter Diagnosis     ICD-10-CM    1. Sprain of right acromioclavicular ligament, subsequent encounter  S43.51XD       2. Right arm weakness  R29.898       3. Acute pain of right shoulder  M25.511       4. Strain of right rotator cuff capsule, subsequent encounter  S46.011D       5. Right wrist pain  M25.531       6. Neck pain  M54.2       7. Acute left-sided low back pain without sciatica  M54.50       8. Chronic pain of right knee  M25.561     G89.29               Start Time: 1753  Stop Time: 183  Total time in clinic (min): 38 minutes    Assessment  Impairments: abnormal coordination, abnormal gait, abnormal muscle firing, abnormal muscle tone, abnormal or restricted ROM, abnormal movement, activity intolerance, impaired physical strength, pain with function, poor posture , poor body mechanics, unable to perform ADL, participation limitations, activity limitations and endurance  Symptom irritability: moderate    Assessment details: Pt is a 44 y.o male who presents to skilled physical therapy for his 9th visit following a work related accident resulting in injuries to his R shoulder, forearm, and R hand/wrist, neck and low back, as well as R knee pain. Pt has not been seen in PT in over 1 month. At time of re-evaluation, pt demonstrated continued fair posture, decreased R shoulder A/PROM in all directions with pain at end ranges, slightly increased R shoulder & elbow MMT with decreased pain, slightly decreased R hand  strength with popping noted, no change in lumbar or c/s ROM, and continued impaired R knee strength. Pt has not shown significnat improvement in his pain or deficits since his last re-evaluation, however he has not been seen very frequently for treatment. His shoulder and wrist/hand injury continues to limit him from grabbing,  holding, and lifting anything without pain and difficulty or results in him dropping items. Pt was re-educated on his diagnosis, prognosis, POC, and HEP as well as the need to perform his exercises every day as well as the benefits of coming to PT more frequently. Pt would continue to benefit from skilled physical therapy to reduce his pain, address his deficits, progress towards his goals, and return to his PLOF. If no progress is seen in the next few weeks, pt was advised to return to his referring provider for further evaluation and treatment.   Understanding of Dx/Px/POC: good     Prognosis: fair    Goals  Short Term Goals:  1) Pt will initiate and progress his HEP in 3-4 weeks.- Met  2) Pt will improve his shoulder flexion A/PROM to at least 160 degrees to improve his ADLs in 3-4 weeks.- Progressing  3) Pt will improve his shoulder MMT by 1 to improve his ADLs in 3-4 weeks.- Partially Met  4) Pt will improve his  strength to at least 80lbs to help him carry a cup in 3-4 weeks.- Progressing    Long Term Goals:  1) Pt will be able to sleep 6-8 hours without waking up from pain in 6-8 weeks.- Progressing  2) Pt will be able to take a shower without shoulder pain in 6-8 weeks.- Progressing  3) Pt will be able to reach overhead to grab a plate with less than 3/10 pain in 6-8 weeks.- Progressing  4) Pt will be able to perform his work duties with less than 3/10 pain in 6-8 weeks.- Progressing  5) Pt will be able to perform his ADLs with less than 3/10 pain in 6-8 weeks.- Progressing    Plan  Patient would benefit from: skilled physical therapy and PT eval  Planned modality interventions: cryotherapy and thermotherapy: hydrocollator packs    Planned therapy interventions: activity modification, joint mobilization, manual therapy, ADL retraining, body mechanics training, motor coordination training, neuromuscular re-education, coordination, patient/caregiver education, postural training, self care, strengthening,  stretching, therapeutic activities, therapeutic exercise, flexibility, functional ROM exercises, graded exercise and home exercise program    Frequency: 1-2x week  Duration in weeks: 6  Plan of Care beginning date: 2025  Plan of Care expiration date: 4/3/2025  Treatment plan discussed with: patient        Subjective Evaluation    History of Present Illness  Mechanism of injury: trauma  Mechanism of injury: 25 Update:  Pt states no significant changes in his R shoulder, R wrist, or R knee symptoms. Pt states doing his exercises every other day. He continues to drop things due to R hand/wrist weakness. Pt continues to have pain laying down at night with difficulty sleeping. He also feels burning and pain in his R knee when sleeping. Pt saw Dr. Toscano for his continued R shoulder pain and was offered an injection or potential surgery but he held off for now. Pt is frustrated with his continued pain and its limitations.   Patient Goals  Patient goals for therapy: decreased pain, increased motion, increased strength, independence with ADLs/IADLs, return to sport/leisure activities and return to work    Pain  Current pain ratin  At best pain ratin  At worst pain ratin (knee and shoulder)  Location: anterior shoulder, anterior forearm, R hand, L side of neck, L low back/glute, R knee  Quality: tight, sharp, dull ache, burning and discomfort  Relieving factors: medications  Aggravating factors: overhead activity, lifting and sitting (sleeping)  Progression: no change    Social Support  Lives with: spouse and adult children    Employment status: working  Hand dominance: right      Diagnostic Tests  X-ray: normal  MRI studies: abnormal        Objective     Postural Observations  Seated posture: fair  Standing posture: fair      Palpation     Right   No palpable tenderness to the latissimus.   Hypertonic in the pectoralis major and upper trapezius.   Tenderness of the anterior deltoid, biceps, pectoralis  major, pectoralis minor, supraspinatus, triceps and upper trapezius.     Tenderness     Right Shoulder  Tenderness in the AC joint, biceps tendon (proximal) and bicipital groove. No tenderness in the infraspinatus tendon, lateral scapula, medial scapula, scapular spine, SC joint and supraspinatus tendon.     Active Range of Motion   Cervical/Thoracic Spine       Cervical    Flexion:  Restriction level: minimal  Extension:  Restriction level: moderate  Left lateral flexion:  Restriction level: moderate  Right lateral flexion:  Restriction level minimal  Left rotation:  Restriction level: moderate  Right rotation:  Restriction level: minimal  Left Shoulder   External rotation BTH: T1   Internal rotation BTB: T9     Right Shoulder   Flexion: 150 degrees   Abduction: 123 degrees   Adduction: with pain  External rotation BTH: C6   Internal rotation BTB: Active internal rotation behind the back: R glute. with pain    Lumbar   Flexion:  with pain Restriction level: minimal  Extension:  with pain Restriction level: moderate  Left lateral flexion:  with pain Restriction level: moderate  Right lateral flexion:  Restriction level: minimal  Left rotation:  Restriction level: minimal  Right rotation:  Restriction level: minimal    Right Knee   Flexion: 120 degrees     Passive Range of Motion     Right Knee   Flexion: 129 degrees with pain    Strength/Myotome Testing     Right Shoulder     Planes of Motion   Flexion: 4-   Abduction: 4-   External rotation at 0°: 4   Internal rotation at 0°: 4     Left Elbow   Flexion: 5  Extension: 5    Right Elbow   Flexion: 4  Extension: 4    Left Wrist/Hand      (2nd hand position)     Trial 1: 100    Trial 2: 80    Trial 3: 60    Average: 80    Right Wrist/Hand      (2nd hand position)     Trial 1: 30    Trial 2: 35    Trial 3: 40    Average: 35    Left Knee   Flexion: 5  Extension: 5    Right Knee   Flexion: 3+  Extension: 3+    Additional Strength Details  Pain with flexion, abd,  "ER    Tests     Right Shoulder   Positive empty can, full can, Hawkin's, Neer's and painful arc.     Ambulation   Weight-Bearing Status   Weight-Bearing Status (Left): full weight bearing   Weight-Bearing Status (Right): full weight-bearing    Assistive device used: none    Observational Gait   Gait: asymmetric   Increased left stance time and right swing time. Decreased walking speed, stride length, right stance time, left swing time, left step length and right step length.                 Insurance:  AMA/CMS Eval/ Re-eval Auth #/ Referral # Total units or visits Start date  Expiration date KX? Visit limitation?  PT only or  PT+OT? Co-Insurance   CMS 10/31/24   10/31/24 12/20/24       CMS 12/19/24           CMS 2/20/25             POC Start Date POC Expiration Date Signed POC?   10/31/24 12/26/24 YES   12/19/24 2/20/25 YES   2/20/25 4/3/25 PENDING      Date               Visits/Units:  Used               Authed:  Remaining                    Precautions: standard  HEP: Access Code [email or web address]    Date 2/20 1/16 1/2/25 12/19 12/12 12/5   Visit Number [phone]   Manuals         C/s PA mobilizations         STM         C/s traction                  Neuro Re-Ed      20'    Scap retr     Rows & ext blue tubing 15x ea     Pain w/ rows at end range  Rows & ext blue 15x ea   Chin tucks         Supine horiz abd     Std blue tubing 1x10     Supine AROM in pain free range 1x10  RTB 2x10   B/L shoulder ER w/ scap set      YTB 1x10     LTR w/ legs supported on grn pball      5\"x5 R/L     Std pball press      5\"x15 grn pball on plinth     LAQ into HS curl  1x10 ea       Quad sets  1x15       SLR  1x15    3x stopped due to sharp pain    Quad sets 10x   TKE      [place] 3.6 2x10   Prone I, T, Y   2x10 ea      Ball wall circles   30x cw/ccw @90 flex & abd      Ther Ex         GH table slides   Supine cane flex 2x10  Supine cane flex 1x10 Supine cane flex 1x10   Supine chest press w/ cane      2.5# CW on cane 1x10     Cane ER PROM     "     Cane abd PROM         Towel  squeezes         Supine cane flexion PROM         Lat pull down   [place] 3.5 3x10      Standing GH horizontal abd   [place] 1.0 1x10      Prone quad str  4x30s       Heel slides  1x15       Ther Activity                           Gait Training                           Modalities

## 2025-02-27 ENCOUNTER — APPOINTMENT (OUTPATIENT)
Dept: PHYSICAL THERAPY | Facility: CLINIC | Age: 45
End: 2025-02-27
Payer: OTHER MISCELLANEOUS